# Patient Record
Sex: MALE | Race: BLACK OR AFRICAN AMERICAN | NOT HISPANIC OR LATINO | Employment: OTHER | URBAN - METROPOLITAN AREA
[De-identification: names, ages, dates, MRNs, and addresses within clinical notes are randomized per-mention and may not be internally consistent; named-entity substitution may affect disease eponyms.]

---

## 2020-04-26 ENCOUNTER — HOSPITAL ENCOUNTER (OUTPATIENT)
Dept: TELEMEDICINE | Facility: HOSPITAL | Age: 75
Discharge: HOME | End: 2020-04-26
Payer: MEDICARE

## 2020-04-26 DIAGNOSIS — G45.9 TRANSIENT ISCHEMIC ATTACK: ICD-10-CM

## 2020-04-26 PROCEDURE — G0426 PR INPT TELEHEALTH CONSULT 50M: ICD-10-PCS | Mod: GT,,, | Performed by: PSYCHIATRY & NEUROLOGY

## 2020-04-26 PROCEDURE — G0426 INPT/ED TELECONSULT50: HCPCS | Mod: GT,,, | Performed by: PSYCHIATRY & NEUROLOGY

## 2020-04-26 NOTE — ASSESSMENT & PLAN NOTE
Transient episode of speech dysfunction, now resolved. DDx includes TIA.  Antithrombotics for secondary stroke prevention:  Load aspirin 325 mg & clopidogrel 300 mg x 1 now, followed by daily aspirin 81 mg /  clopidogrel 75 mg x 30 days followed by monotherapy therafter      Statins for secondary stroke prevention and hyperlipidemia, if present:   Statins: Atorvastatin- 80 mg daily    Aggressive risk factor modification: HTN     Rehab efforts: The patient has been evaluated by a stroke team provider and the therapy needs have been fully considered based off the presenting complaints and exam findings. The following therapy evaluations are needed: PT evaluate and treat, OT evaluate and treat, SLP evaluate and treat    Diagnostics ordered/pending: CTA Head to assess vasculature , CTA Neck/Arch to assess vasculature, HgbA1C to assess blood glucose levels, Lipid Profile to assess cholesterol levels, MRI head without contrast to assess brain parenchyma, TTE to assess cardiac function/status     VTE prophylaxis: Enoxaparin 40 mg SQ every 24 hours    BP parameters: TIA: SBP <220 until imaging confirmation of no infarct

## 2020-04-26 NOTE — SUBJECTIVE & OBJECTIVE
Woke up with symptoms?: no    Recent bleeding noted: no  Does the patient take any Blood Thinners? no  Medications: No relevant medications      Past Medical History:   No past medical history on file.  Arthritis, HTN, DM, HLD, TIAs  Past Surgical History: no major surgeries within the last 2 weeks    Family History: no relevant history    Social History: no smoking, no drinking, no drugs    Allergies: Allergies have not been reviewed No relevant allergies    Review of Systems   Constitutional: Negative for appetite change, chills and fever.   HENT: Negative for congestion and sore throat.    Eyes: Negative for discharge and itching.   Respiratory: Negative for apnea and shortness of breath.    Cardiovascular: Negative for chest pain and palpitations.   Gastrointestinal: Negative for abdominal pain and anal bleeding.   Endocrine: Negative for cold intolerance and polydipsia.   Genitourinary: Negative for dysuria and hematuria.   Musculoskeletal: Negative for joint swelling and myalgias.   Skin: Negative for color change and rash.   Neurological: Negative for tremors.   Psychiatric/Behavioral: Negative for hallucinations and self-injury.     Objective:   Vitals:  /82, HR 66, o2 98%, RR 12    CT READ: No - not completed    Physical Exam   Constitutional: He appears well-nourished. No distress.   HENT:   Head: Atraumatic.   Right Ear: External ear normal.   Left Ear: External ear normal.   Eyes: Conjunctivae are normal. No scleral icterus.   Neck: Normal range of motion.   Pulmonary/Chest: Effort normal.   Abdominal: He exhibits no distension. There is no guarding.   Musculoskeletal: Normal range of motion. He exhibits no deformity.   Neurological: He is alert.   Skin: Skin is warm and dry.   Psychiatric: He has a normal mood and affect.

## 2020-04-26 NOTE — HPI
75M w/ 30 seconds of inability to speak, now resolved. The aforementioned symptoms have never happened before. There are no identified triggers or modifying factors. There have been no recurrent events. There are no other associated symptoms.

## 2022-02-09 ENCOUNTER — HISTORICAL (OUTPATIENT)
Dept: ADMINISTRATIVE | Facility: HOSPITAL | Age: 77
End: 2022-02-09

## 2022-04-11 ENCOUNTER — HISTORICAL (OUTPATIENT)
Dept: ADMINISTRATIVE | Facility: HOSPITAL | Age: 77
End: 2022-04-11
Payer: MEDICARE

## 2022-04-29 VITALS
DIASTOLIC BLOOD PRESSURE: 73 MMHG | WEIGHT: 154.31 LBS | BODY MASS INDEX: 24.8 KG/M2 | HEIGHT: 66 IN | SYSTOLIC BLOOD PRESSURE: 132 MMHG

## 2022-10-14 DIAGNOSIS — K76.9 LIVER LESION: Primary | ICD-10-CM

## 2022-10-31 DIAGNOSIS — K76.9 LIVER LESION: Primary | ICD-10-CM

## 2022-11-03 ENCOUNTER — OUTSIDE PLACE OF SERVICE (OUTPATIENT)
Dept: ADMINISTRATIVE | Facility: OTHER | Age: 77
End: 2022-11-03
Payer: MEDICARE

## 2022-11-09 ENCOUNTER — OFFICE VISIT (OUTPATIENT)
Dept: HEMATOLOGY/ONCOLOGY | Facility: CLINIC | Age: 77
End: 2022-11-09
Payer: MEDICARE

## 2022-11-09 VITALS
RESPIRATION RATE: 16 BRPM | DIASTOLIC BLOOD PRESSURE: 78 MMHG | BODY MASS INDEX: 19.99 KG/M2 | HEART RATE: 107 BPM | SYSTOLIC BLOOD PRESSURE: 134 MMHG | HEIGHT: 65 IN | TEMPERATURE: 99 F | WEIGHT: 120 LBS | OXYGEN SATURATION: 97 %

## 2022-11-09 DIAGNOSIS — G89.3 CANCER RELATED PAIN: ICD-10-CM

## 2022-11-09 DIAGNOSIS — C25.7 MALIGNANT NEOPLASM OF OTHER PARTS OF PANCREAS: ICD-10-CM

## 2022-11-09 PROCEDURE — 1101F PR PT FALLS ASSESS DOC 0-1 FALLS W/OUT INJ PAST YR: ICD-10-PCS | Mod: CPTII,,, | Performed by: STUDENT IN AN ORGANIZED HEALTH CARE EDUCATION/TRAINING PROGRAM

## 2022-11-09 PROCEDURE — 1101F PT FALLS ASSESS-DOCD LE1/YR: CPT | Mod: CPTII,,, | Performed by: STUDENT IN AN ORGANIZED HEALTH CARE EDUCATION/TRAINING PROGRAM

## 2022-11-09 PROCEDURE — 99205 OFFICE O/P NEW HI 60 MIN: CPT | Mod: ,,, | Performed by: STUDENT IN AN ORGANIZED HEALTH CARE EDUCATION/TRAINING PROGRAM

## 2022-11-09 PROCEDURE — 1125F AMNT PAIN NOTED PAIN PRSNT: CPT | Mod: CPTII,,, | Performed by: STUDENT IN AN ORGANIZED HEALTH CARE EDUCATION/TRAINING PROGRAM

## 2022-11-09 PROCEDURE — 3288F FALL RISK ASSESSMENT DOCD: CPT | Mod: CPTII,,, | Performed by: STUDENT IN AN ORGANIZED HEALTH CARE EDUCATION/TRAINING PROGRAM

## 2022-11-09 PROCEDURE — 1125F PR PAIN SEVERITY QUANTIFIED, PAIN PRESENT: ICD-10-PCS | Mod: CPTII,,, | Performed by: STUDENT IN AN ORGANIZED HEALTH CARE EDUCATION/TRAINING PROGRAM

## 2022-11-09 PROCEDURE — 3075F SYST BP GE 130 - 139MM HG: CPT | Mod: CPTII,,, | Performed by: STUDENT IN AN ORGANIZED HEALTH CARE EDUCATION/TRAINING PROGRAM

## 2022-11-09 PROCEDURE — 4010F ACE/ARB THERAPY RXD/TAKEN: CPT | Mod: CPTII,,, | Performed by: STUDENT IN AN ORGANIZED HEALTH CARE EDUCATION/TRAINING PROGRAM

## 2022-11-09 PROCEDURE — 99205 PR OFFICE/OUTPT VISIT, NEW, LEVL V, 60-74 MIN: ICD-10-PCS | Mod: ,,, | Performed by: STUDENT IN AN ORGANIZED HEALTH CARE EDUCATION/TRAINING PROGRAM

## 2022-11-09 PROCEDURE — 1159F PR MEDICATION LIST DOCUMENTED IN MEDICAL RECORD: ICD-10-PCS | Mod: CPTII,,, | Performed by: STUDENT IN AN ORGANIZED HEALTH CARE EDUCATION/TRAINING PROGRAM

## 2022-11-09 PROCEDURE — 3288F PR FALLS RISK ASSESSMENT DOCUMENTED: ICD-10-PCS | Mod: CPTII,,, | Performed by: STUDENT IN AN ORGANIZED HEALTH CARE EDUCATION/TRAINING PROGRAM

## 2022-11-09 PROCEDURE — 1159F MED LIST DOCD IN RCRD: CPT | Mod: CPTII,,, | Performed by: STUDENT IN AN ORGANIZED HEALTH CARE EDUCATION/TRAINING PROGRAM

## 2022-11-09 PROCEDURE — 3075F PR MOST RECENT SYSTOLIC BLOOD PRESS GE 130-139MM HG: ICD-10-PCS | Mod: CPTII,,, | Performed by: STUDENT IN AN ORGANIZED HEALTH CARE EDUCATION/TRAINING PROGRAM

## 2022-11-09 PROCEDURE — 3078F DIAST BP <80 MM HG: CPT | Mod: CPTII,,, | Performed by: STUDENT IN AN ORGANIZED HEALTH CARE EDUCATION/TRAINING PROGRAM

## 2022-11-09 PROCEDURE — 4010F PR ACE/ARB THEARPY RXD/TAKEN: ICD-10-PCS | Mod: CPTII,,, | Performed by: STUDENT IN AN ORGANIZED HEALTH CARE EDUCATION/TRAINING PROGRAM

## 2022-11-09 PROCEDURE — 3078F PR MOST RECENT DIASTOLIC BLOOD PRESSURE < 80 MM HG: ICD-10-PCS | Mod: CPTII,,, | Performed by: STUDENT IN AN ORGANIZED HEALTH CARE EDUCATION/TRAINING PROGRAM

## 2022-11-09 RX ORDER — AMLODIPINE BESYLATE 10 MG/1
10 TABLET ORAL DAILY
COMMUNITY
Start: 2022-08-03

## 2022-11-09 RX ORDER — NAPROXEN SODIUM 220 MG/1
81 TABLET, FILM COATED ORAL
COMMUNITY

## 2022-11-09 RX ORDER — METFORMIN HYDROCHLORIDE 500 MG/1
500 TABLET ORAL 2 TIMES DAILY
COMMUNITY
Start: 2022-08-03

## 2022-11-09 RX ORDER — GABAPENTIN 600 MG/1
TABLET ORAL
COMMUNITY
Start: 2022-10-03

## 2022-11-09 RX ORDER — DEXTROSE 4 G
TABLET,CHEWABLE ORAL
COMMUNITY
Start: 2022-07-19

## 2022-11-09 RX ORDER — SULFAMETHOXAZOLE AND TRIMETHOPRIM 800; 160 MG/1; MG/1
1 TABLET ORAL 2 TIMES DAILY
COMMUNITY
Start: 2022-07-19

## 2022-11-09 RX ORDER — LOSARTAN POTASSIUM 100 MG/1
100 TABLET ORAL DAILY
COMMUNITY
Start: 2022-08-03

## 2022-11-09 RX ORDER — FAMOTIDINE 20 MG/1
20 TABLET, FILM COATED ORAL DAILY
COMMUNITY
Start: 2022-10-20

## 2022-11-09 RX ORDER — ATORVASTATIN CALCIUM 20 MG/1
20 TABLET, FILM COATED ORAL DAILY
COMMUNITY
Start: 2022-08-20

## 2022-11-09 RX ORDER — HYDROCODONE BITARTRATE AND ACETAMINOPHEN 7.5; 325 MG/1; MG/1
1 TABLET ORAL EVERY 6 HOURS PRN
COMMUNITY
Start: 2022-10-12 | End: 2022-11-09 | Stop reason: ALTCHOICE

## 2022-11-09 RX ORDER — ONDANSETRON 4 MG/1
4 TABLET, ORALLY DISINTEGRATING ORAL EVERY 8 HOURS PRN
Qty: 30 TABLET | Refills: 0 | Status: SHIPPED | OUTPATIENT
Start: 2022-11-09 | End: 2022-12-23

## 2022-11-09 RX ORDER — GLIPIZIDE 5 MG/1
5 TABLET ORAL 2 TIMES DAILY
COMMUNITY
Start: 2022-08-20

## 2022-11-09 RX ORDER — INSULIN GLARGINE 100 [IU]/ML
INJECTION, SOLUTION SUBCUTANEOUS
COMMUNITY
Start: 2022-10-03

## 2022-11-09 RX ORDER — CITALOPRAM 10 MG/1
10 TABLET ORAL DAILY
COMMUNITY
Start: 2022-10-03

## 2022-11-09 RX ORDER — OXYCODONE HYDROCHLORIDE 5 MG/1
5 TABLET ORAL EVERY 6 HOURS PRN
Qty: 30 TABLET | Refills: 0 | Status: SHIPPED | OUTPATIENT
Start: 2022-11-09 | End: 2022-12-09

## 2022-11-09 RX ORDER — CLOPIDOGREL BISULFATE 75 MG/1
75 TABLET ORAL DAILY
COMMUNITY
Start: 2022-08-20

## 2022-11-09 NOTE — PROGRESS NOTES
Referring provider:  [facility] consult   Reason for consultation:  Pancreatic cancer     HPI    Patient is a 77-year-old with history of multiple CVAs, hypertension hyperlipidemia who was recently in the hospital with altered mentation when he was found to have a UTI.  During this workup he had a CT abdomen pelvis without contrast which revealed a pancreatic mass with liver metastatic disease concerning for malignancy.  Patient had a biopsy of this mass on 11/02/2022, pathology from which revealed moderately differentiated adenocarcinoma concerning for pancreatic biliary/upper GI primary.  He presented to clinic on 11/09/2022, to establish care to discuss further disease management.      Today, 11/09/2022, patient reports symptoms of fatigue, weakness, decreased appetite and declining ECOG.  He is requiring partial assistance with his ADLs.  He walks with a walker however denies any falls.  He reports nausea, vomiting causing decreased appetite and weight loss.  He reports pain around his right upper quadrant  Which is poorly controlled on his current pain regimen.      Patient has an ECOG of 2-3, lives with his 2 daughters, requiring partial assistance with his ADLs.  Current smoker, denies any recreational drug use, reports occasional alcohol use.  He is an extended family lives close by.  Denies any family history of malignancy.    Pathology  11/04/2022 liver biopsy:  Positive for malignancy consistent with moderately differentiated adenocarcinoma, CK7, CDX2 positive, negative for CK20 and TTF 1.      Imaging  CT abdomen pelvis without contrast:  Diffusely edematous appearing pancreas with peripancreatic inflammatory stranding and possible pancreatic ductal dilatation.  Findings can be seen in setting of acute pancreatitis.  Correlation with amylase and lipase is recommended.  Vague soft tissue density structure along the superior aspect of the pancreatic body suspicious for a 3.5 cm pancreatic mass.  Follow-up CT of  the abdomen with intravenous contrast is recommended for further eval when clinically feasible.  Development of multiple hypodense hepatic lesions considered highly suspicious for metastatic disease.  Status post cholecystectomy.  Ectasia of the abdominal aorta measuring 2.8 cm.  Stomach is poorly distended and this is felt to be account for apparent wall thickening.  Minimal pericardial effusion.    Past Medical History:   Diagnosis Date    Arthritis     Diabetes mellitus     Hypertension     Unspecified cholesteatoma, bilateral        Past Surgical History:   Procedure Laterality Date    CHOLECYSTECTOMY      stints Bilateral     LEGS AND NECK       Family History   Problem Relation Age of Onset    Emphysema Mother     Hypertension Father        Social History     Socioeconomic History    Marital status: Single   Tobacco Use    Smoking status: Former     Packs/day: 0.50     Years: 62.00     Pack years: 31.00     Types: Cigarettes    Smokeless tobacco: Never   Substance and Sexual Activity    Alcohol use: Yes     Comment: OCCAN    Drug use: Not Currently    Sexual activity: Not Currently       Current Outpatient Medications   Medication Sig Dispense Refill    amLODIPine (NORVASC) 10 MG tablet Take 10 mg by mouth once daily.      aspirin 81 MG Chew Take 81 mg by mouth.      atorvastatin (LIPITOR) 20 MG tablet Take 20 mg by mouth once daily.      blood sugar diagnostic Strp Check blood glucose 4 time(s) a day.      blood-glucose meter Misc Use as directed      citalopram (CELEXA) 10 MG tablet Take 10 mg by mouth once daily.      clopidogreL (PLAVIX) 75 mg tablet Take 75 mg by mouth once daily.      gabapentin (NEURONTIN) 600 MG tablet Take by mouth.      glipiZIDE (GLUCOTROL) 5 MG tablet Take 5 mg by mouth 2 (two) times daily.      losartan (COZAAR) 100 MG tablet Take 100 mg by mouth once daily.      metFORMIN (GLUCOPHAGE) 500 MG tablet Take 500 mg by mouth 2 (two) times daily.      famotidine (PEPCID) 20 MG tablet  Take 20 mg by mouth once daily.      LANTUS SOLOSTAR U-100 INSULIN glargine 100 units/mL SubQ pen Inject into the skin.      ondansetron (ZOFRAN-ODT) 4 MG TbDL Take 1 tablet (4 mg total) by mouth every 8 (eight) hours as needed. 30 tablet 0    oxyCODONE (ROXICODONE) 5 MG immediate release tablet Take 1 tablet (5 mg total) by mouth every 6 (six) hours as needed for Pain. 30 tablet 0    sulfamethoxazole-trimethoprim 800-160mg (BACTRIM DS) 800-160 mg Tab Take 1 tablet by mouth 2 (two) times daily.       No current facility-administered medications for this visit.       Review of patient's allergies indicates:  No Known Allergies      Review of systems   CONSTITUTIONAL: no fevers, no chills, + weight loss, + fatigue, + weakness  HEMATOLOGIC: no abnormal bleeding, no abnormal bruising, no drenching night sweats  ONCOLOGIC: no new masses or lumps  HEENT: no vision loss, no tinnitus or hearing loss, no nose bleeding, no dysphagia, no odynophagia  CVS: no chest pain, no palpitations, no dyspnea on exertion  RESP: no shortness of breath, no hemoptysis, no cough  GI: no nausea, no vomiting, no diarrhea, no constipation, no melena, no hematochezia, no hematemesis, + abdominal pain, no increase in abdominal girth  : no dysuria, no hematuria, no hesitancy, no scrotal swelling, no discharge  INTEGUMENT: no rashes, no abnormal bruising, no nail pitting, no hyperpigmentation  NEURO: no falls, no memory loss, no paresthesias or dysesthesias, no urofecal incontinence or retention, no loss of strength on any extremity  MSK: no back pain, no new joint pain, no joint swelling  PSYCH: no suicidal or homicidal ideation, no depression, no insomnia, no anhedonia  ENDOCRINE: no heat or cold intolerance, no polyuria, no polydipsia      Physical Exam:  Vitals:    11/09/22 1127   BP: 134/78   Pulse: 107   Resp: 16   Temp: 99.1 °F (37.3 °C)       ECOG PS 0  GA: AAOx3, NAD  HEENT: NCAT, PERRLA, EOMI, good dentition, moist oral mucous  membranes  LYMPH: no cervical, axillary or supraclavicular adenopathy  CVS: s1s2 RRR, no M/R/G  RESP: CTA b/l, no crackles, no wheezes or rhonchi  ABD: soft, NT, ND, BS+, no hepatosplenomegaly  EXT: no deformities, no pedal edema  SKIN: no rashes, warm and dry  NEURO: normal mentation, strength 5/5 on all 4 extremities, no sensory deficits      Assessment and plan     # Metastatic pancreatic adenocarcinoma, with liver involvement   Patient was excellent diagnosed with a pancreatic mass measuring 3.5 cm along with multiple hypodense hepatic lesions concerning for malignancy during his evaluation for altered mentation/UTI   Reviewed CT abdomen pelvis without contrast and pathology report  Discussed with patient and family in the hospital and today the diagnosis of stage IV pancreatic adenocarcinoma, palliative nature of treatment and incurable nature of the disease.    Patient has modest ECOG and appears frail.  He will not be a good candidate for intense chemotherapy  Discussed the option for single agent gemcitabine if NGS, MSI and HER2 is negative   Patient and his family would like to think and discuss the options of pursuing palliative systemic chemotherapy versus comfort care with hospice services, both of which are not unreasonable.    Patient's family will be calling us early next week to let us know about their decision   Discussed  narrow window of opportunity to initiate treatment given concerns for pending visceral crisis with liver involvement.      Plan   CBC, CMP, CEA today   Port placement with surgery in anticipation of patient's starting chemotherapy in the future   Orders for NGS, HER2 and MSI sent to pathology   Discussed options for targeted therapy/immunotherapy or HER2 directed therapy if above workup is positive.  Will hold off on CT chest to complete staging workup depending on patient's decision to pursue treatment.  Oxycodone 5 mg q.6 hours p.r.n. for cancer-related pain   Zofran 4 mg q.8  hours p.r.n. for nausea   Plan to follow-up in clinic in 10-14 days with MD to discuss above workup and further disease management      A total of  60 minutes were spent in review of records, interpretation of test, coordination of care, discussion and counseling with the patient.        Portions of the record may have been created with voice recognition software. Occasional wrong-word or sound-a-like substitutions may have occurred due to the inherent limitations of voice recognition software. Read the chart carefully and recognize, using context, where substitutions have occurred.

## 2022-11-19 NOTE — PROGRESS NOTES
DATE:  11/22/2022    PROBLEM:  STAGE IV Cancer Pancreas    HxPI:  77-year-old with history of multiple CVAs, hypertension hyperlipidemia who was recently in the hospital with altered mentation when he was found to have a UTI.  During this workup he had a CT abdomen pelvis without contrast which revealed a pancreatic mass with liver metastatic disease concerning for malignancy.  Patient had a biopsy of this mass on 11/02/2022, pathology from which revealed moderately differentiated adenocarcinoma concerning for pancreatic biliary/upper GI primary.  He presented to clinic on 11/09/2022, to establish care to discuss further disease management.      INTERVAL Hx:  11/22/2022  Unfortunately, patient has deteriorated further since last seen in this clinic.  He is resting more.  His ECOG performance status is now down to 3.  He may be up and about 30-40% of the day.  Tumor marker study revealed patient is equivocal on HCT are at 2+.  This might present a targeted approach if the patient and family are interested.    PATH:  11/04/2022 liver biopsy:  Positive for malignancy consistent with moderately differentiated adenocarcinoma, CK7, CDX2 positive, negative for CK20 and TTF 1.    IMAGING:  CT abdomen pelvis without contrast:  Diffusely edematous appearing pancreas with peripancreatic inflammatory stranding and possible pancreatic ductal dilatation.  Findings can be seen in setting of acute pancreatitis.  Correlation with amylase and lipase is recommended.  Vague soft tissue density structure along the superior aspect of the pancreatic body suspicious for a 3.5 cm pancreatic mass.  Follow-up CT of the abdomen with intravenous contrast is recommended for further eval when clinically feasible.  Development of multiple hypodense hepatic lesions considered highly suspicious for metastatic disease.  Status post cholecystectomy.  Ectasia of the abdominal aorta measuring 2.8 cm.  Stomach is poorly distended and this is felt to be  account for apparent wall thickening.  Minimal pericardial effusion.    Past Medical History:   Diagnosis Date    Arthritis     Diabetes mellitus     Hypertension     Unspecified cholesteatoma, bilateral        Past Surgical History:   Procedure Laterality Date    CHOLECYSTECTOMY      stints Bilateral     LEGS AND NECK       Family History   Problem Relation Age of Onset    Emphysema Mother     Hypertension Father        Social History     Socioeconomic History    Marital status: Single   Tobacco Use    Smoking status: Former     Packs/day: 0.50     Years: 62.00     Pack years: 31.00     Types: Cigarettes    Smokeless tobacco: Never   Substance and Sexual Activity    Alcohol use: Yes     Comment: OCCAN    Drug use: Not Currently    Sexual activity: Not Currently       Current Outpatient Medications   Medication Sig Dispense Refill    amLODIPine (NORVASC) 10 MG tablet Take 10 mg by mouth once daily.      aspirin 81 MG Chew Take 81 mg by mouth.      atorvastatin (LIPITOR) 20 MG tablet Take 20 mg by mouth once daily.      blood sugar diagnostic Strp Check blood glucose 4 time(s) a day.      blood-glucose meter Misc Use as directed      citalopram (CELEXA) 10 MG tablet Take 10 mg by mouth once daily.      clopidogreL (PLAVIX) 75 mg tablet Take 75 mg by mouth once daily.      famotidine (PEPCID) 20 MG tablet Take 20 mg by mouth once daily.      gabapentin (NEURONTIN) 600 MG tablet Take by mouth.      glipiZIDE (GLUCOTROL) 5 MG tablet Take 5 mg by mouth 2 (two) times daily.      LANTUS SOLOSTAR U-100 INSULIN glargine 100 units/mL SubQ pen Inject into the skin.      losartan (COZAAR) 100 MG tablet Take 100 mg by mouth once daily.      metFORMIN (GLUCOPHAGE) 500 MG tablet Take 500 mg by mouth 2 (two) times daily.      ondansetron (ZOFRAN-ODT) 4 MG TbDL Take 1 tablet (4 mg total) by mouth every 8 (eight) hours as needed. 30 tablet 0    oxyCODONE (ROXICODONE) 5 MG immediate release tablet Take 1 tablet (5 mg total) by mouth  every 6 (six) hours as needed for Pain. 30 tablet 0    sulfamethoxazole-trimethoprim 800-160mg (BACTRIM DS) 800-160 mg Tab Take 1 tablet by mouth 2 (two) times daily.       No current facility-administered medications for this visit.       Review of patient's allergies indicates:  No Known Allergies    ROS:  -GENERAL:  Patient denies fevers, chills, weight loss.  -HEENT:  No recent change in vision, hearing, taste or smell.  -LUNGS:  Patient denies cough, sputum production, hemoptysis, or shortness of breath.  -COR:  Patient denies chest pain or palpitations.  -BREAST/CHEST WAll:  Patient denies breast masses or chest wall abnormalities.  -GI:.  RUQ pain radiating to back  -:  Patient denies dysuria, hematuria, or lower tract obstructive symptomatology  -DERM:  Patient denies skin lesions.  -EXT:  Without clubbing, cyanosis, or edema.  OBSERVATIONS:  -GENERAL:  Alert, oriented, appears comfortable at rest.  -VS:  Febrile.  106/69  -HEENT:  PERRLA.  EOMI.  Nasal/oropharynx benign.  -LUNGS:  Clear to auscultation and percussion  -COR:  Regular rate rhythm without murmurs, rubs, heaves.  -BREAST/CHEST WAll:  Normal breasts/chest wall region without palpable masses  -ABD:  Pain on palpation of RUQ  -LYMPH:  No palpable peripheral lymphadenopathy.  -EXT:  Without clubbing, cyanosis, or edema.    ASSESSMENT:   # Metastatic pancreatic adenocarcinoma, with liver involvement   Patient was excellent diagnosed with a pancreatic mass measuring 3.5 cm along with multiple hypodense hepatic lesions concerning for malignancy during his evaluation for altered mentation/UTI   Reviewed CT abdomen pelvis without contrast and pathology report  Discussed with patient and family in the hospital and today the diagnosis of stage IV pancreatic adenocarcinoma, palliative nature of treatment and incurable nature of the disease.    Patient has modest ECOG and appears frail.  He will not be a good candidate for intense chemotherapy  Discussed  the option for single agent gemcitabine if NGS, MSI and HER2 is negative   Patient and his family would like to think and discuss the options of pursuing palliative systemic chemotherapy versus comfort care with hospice services, both of which are not unreasonable.    Patient's family will be calling us early next week to let us know about their decision   Discussed  narrow window of opportunity to initiate treatment given concerns for pending visceral crisis with liver involvement.  Tumor is HCT are 2+ positive.     PLAN:  Patient clearly not a candidate for aggressive systemic chemotherapy.  He might not even be a candidate for targeted treatment.  Nonetheless, I gave him literature to read concerning Erbitux and trastuzumab.  We will increase patient's pain regimen to add long-acting pain medication form of MS Contin 30 mg p.o. q.12 hours.  Patient and family members understand that we are looking at a terminal disease here.  We will hold TeleMed visit next week.  Patient may very well decide to enter hospice for terminal care and we will act accordingly.  In the meantime we will also get patient set up for a wheelchair as he is no longer ambulatory.    NILE GRAY M.D., FACP

## 2022-11-23 ENCOUNTER — OFFICE VISIT (OUTPATIENT)
Dept: HEMATOLOGY/ONCOLOGY | Facility: CLINIC | Age: 77
End: 2022-11-23
Payer: MEDICARE

## 2022-11-23 VITALS
OXYGEN SATURATION: 100 % | HEART RATE: 107 BPM | DIASTOLIC BLOOD PRESSURE: 69 MMHG | RESPIRATION RATE: 18 BRPM | SYSTOLIC BLOOD PRESSURE: 106 MMHG | WEIGHT: 116 LBS | BODY MASS INDEX: 19.3 KG/M2 | TEMPERATURE: 98 F

## 2022-11-23 DIAGNOSIS — C25.1 CANCER, PANCREAS, BODY: Primary | ICD-10-CM

## 2022-11-23 PROCEDURE — 3078F PR MOST RECENT DIASTOLIC BLOOD PRESSURE < 80 MM HG: ICD-10-PCS | Mod: CPTII,,, | Performed by: INTERNAL MEDICINE

## 2022-11-23 PROCEDURE — 4010F ACE/ARB THERAPY RXD/TAKEN: CPT | Mod: CPTII,,, | Performed by: INTERNAL MEDICINE

## 2022-11-23 PROCEDURE — 1159F PR MEDICATION LIST DOCUMENTED IN MEDICAL RECORD: ICD-10-PCS | Mod: CPTII,,, | Performed by: INTERNAL MEDICINE

## 2022-11-23 PROCEDURE — 1159F MED LIST DOCD IN RCRD: CPT | Mod: CPTII,,, | Performed by: INTERNAL MEDICINE

## 2022-11-23 PROCEDURE — 3074F PR MOST RECENT SYSTOLIC BLOOD PRESSURE < 130 MM HG: ICD-10-PCS | Mod: CPTII,,, | Performed by: INTERNAL MEDICINE

## 2022-11-23 PROCEDURE — 99214 PR OFFICE/OUTPT VISIT, EST, LEVL IV, 30-39 MIN: ICD-10-PCS | Mod: ,,, | Performed by: INTERNAL MEDICINE

## 2022-11-23 PROCEDURE — 99214 OFFICE O/P EST MOD 30 MIN: CPT | Mod: ,,, | Performed by: INTERNAL MEDICINE

## 2022-11-23 PROCEDURE — 3074F SYST BP LT 130 MM HG: CPT | Mod: CPTII,,, | Performed by: INTERNAL MEDICINE

## 2022-11-23 PROCEDURE — 4010F PR ACE/ARB THEARPY RXD/TAKEN: ICD-10-PCS | Mod: CPTII,,, | Performed by: INTERNAL MEDICINE

## 2022-11-23 PROCEDURE — 3078F DIAST BP <80 MM HG: CPT | Mod: CPTII,,, | Performed by: INTERNAL MEDICINE

## 2022-11-23 RX ORDER — CALCIUM CARB/VITAMIN D3/VIT K1 500-500-40
TABLET,CHEWABLE ORAL
COMMUNITY
Start: 2022-07-19

## 2022-11-23 RX ORDER — MORPHINE SULFATE 30 MG/1
30 TABLET, FILM COATED, EXTENDED RELEASE ORAL 2 TIMES DAILY
Qty: 60 TABLET | Refills: 0 | Status: SHIPPED | OUTPATIENT
Start: 2022-11-23

## 2022-11-29 ENCOUNTER — OFFICE VISIT (OUTPATIENT)
Dept: HEMATOLOGY/ONCOLOGY | Facility: CLINIC | Age: 77
End: 2022-11-29
Payer: MEDICARE

## 2022-11-29 DIAGNOSIS — C25.7 MALIGNANT NEOPLASM OF OTHER PARTS OF PANCREAS: ICD-10-CM

## 2022-11-29 DIAGNOSIS — C25.1 CANCER, PANCREAS, BODY: ICD-10-CM

## 2022-11-29 PROCEDURE — 1159F PR MEDICATION LIST DOCUMENTED IN MEDICAL RECORD: ICD-10-PCS | Mod: 95,CPTII,, | Performed by: INTERNAL MEDICINE

## 2022-11-29 PROCEDURE — 1159F MED LIST DOCD IN RCRD: CPT | Mod: 95,CPTII,, | Performed by: INTERNAL MEDICINE

## 2022-11-29 PROCEDURE — 1160F RVW MEDS BY RX/DR IN RCRD: CPT | Mod: 95,CPTII,, | Performed by: INTERNAL MEDICINE

## 2022-11-29 PROCEDURE — 4010F ACE/ARB THERAPY RXD/TAKEN: CPT | Mod: 95,CPTII,, | Performed by: INTERNAL MEDICINE

## 2022-11-29 PROCEDURE — 4010F PR ACE/ARB THEARPY RXD/TAKEN: ICD-10-PCS | Mod: 95,CPTII,, | Performed by: INTERNAL MEDICINE

## 2022-11-29 PROCEDURE — 1160F PR REVIEW ALL MEDS BY PRESCRIBER/CLIN PHARMACIST DOCUMENTED: ICD-10-PCS | Mod: 95,CPTII,, | Performed by: INTERNAL MEDICINE

## 2022-11-29 PROCEDURE — 99442 PR PHYSICIAN TELEPHONE EVALUATION 11-20 MIN: CPT | Mod: 95,,, | Performed by: INTERNAL MEDICINE

## 2022-11-29 PROCEDURE — 99442 PR PHYSICIAN TELEPHONE EVALUATION 11-20 MIN: ICD-10-PCS | Mod: 95,,, | Performed by: INTERNAL MEDICINE

## 2022-11-29 RX ORDER — OXYCODONE AND ACETAMINOPHEN 10; 325 MG/1; MG/1
1 TABLET ORAL EVERY 4 HOURS PRN
Qty: 120 TABLET | Refills: 0 | Status: SHIPPED | OUTPATIENT
Start: 2022-11-29

## 2022-11-29 RX ORDER — MORPHINE SULFATE 30 MG/1
30 TABLET, FILM COATED, EXTENDED RELEASE ORAL EVERY 8 HOURS PRN
Qty: 20 TABLET | Refills: 0 | Status: SHIPPED | OUTPATIENT
Start: 2022-11-29

## 2022-11-29 NOTE — PROGRESS NOTES
TELEMED VISIT  PATIENT LOCATION: HOME  VISIT TYPE: VIRTUAL AUDIO  VISIT TIME: 13 MIN.    DATE:  11/29/2022    PROBLEM:  STAGE IV Cancer Pancreas    HxPI:  77-year-old with history of multiple CVAs, hypertension hyperlipidemia who was recently in the hospital with altered mentation when he was found to have a UTI.  During this workup he had a CT abdomen pelvis without contrast which revealed a pancreatic mass with liver metastatic disease concerning for malignancy.  Patient had a biopsy of this mass on 11/02/2022, pathology from which revealed moderately differentiated adenocarcinoma concerning for pancreatic biliary/upper GI primary.  He presented to clinic on 11/09/2022, to establish care to discuss further disease management.      INTERVAL Hx:  11/29/2022  Unfortunately, patient has not done well since last seen.  The Percocet 5/325 is not controlling his pain.  And he is going through it at 4 hour intervals.  Our attempts at getting him started on MS Contin 30 mg p.o. q.12 hours was unsuccessful as his pharmacy did not have any in stock and we just learned about that matter today.  Patient has not come to any conclusion as to whether or not he wants cancer treatment for palliative intent heavy well make that decision until we get his pain under control.    PATH:  11/04/2022 liver biopsy:  Positive for malignancy consistent with moderately differentiated adenocarcinoma, CK7, CDX2 positive, negative for CK20 and TTF 1.    IMAGING:  CT abdomen pelvis without contrast:  Diffusely edematous appearing pancreas with peripancreatic inflammatory stranding and possible pancreatic ductal dilatation.  Findings can be seen in setting of acute pancreatitis.  Correlation with amylase and lipase is recommended.  Vague soft tissue density structure along the superior aspect of the pancreatic body suspicious for a 3.5 cm pancreatic mass.  Follow-up CT of the abdomen with intravenous contrast is recommended for further eval when  clinically feasible.  Development of multiple hypodense hepatic lesions considered highly suspicious for metastatic disease.  Status post cholecystectomy.  Ectasia of the abdominal aorta measuring 2.8 cm.  Stomach is poorly distended and this is felt to be account for apparent wall thickening.  Minimal pericardial effusion.    Past Medical History:   Diagnosis Date    Arthritis     Diabetes mellitus     Hypertension     Unspecified cholesteatoma, bilateral        Past Surgical History:   Procedure Laterality Date    CHOLECYSTECTOMY      stints Bilateral     LEGS AND NECK       Family History   Problem Relation Age of Onset    Emphysema Mother     Hypertension Father        Social History     Socioeconomic History    Marital status: Single   Tobacco Use    Smoking status: Some Days     Packs/day: 0.25     Years: 62.00     Pack years: 15.50     Types: Cigarettes    Smokeless tobacco: Never   Substance and Sexual Activity    Alcohol use: Not Currently     Comment: OCCAN    Drug use: Not Currently    Sexual activity: Not Currently       Current Outpatient Medications   Medication Sig Dispense Refill    amLODIPine (NORVASC) 10 MG tablet Take 10 mg by mouth once daily.      aspirin 81 MG Chew Take 81 mg by mouth.      atorvastatin (LIPITOR) 20 MG tablet Take 20 mg by mouth once daily.      blood sugar diagnostic Strp Check blood glucose 4 time(s) a day.      blood-glucose meter Misc Use as directed      citalopram (CELEXA) 10 MG tablet Take 10 mg by mouth once daily.      clopidogreL (PLAVIX) 75 mg tablet Take 75 mg by mouth once daily.      famotidine (PEPCID) 20 MG tablet Take 20 mg by mouth once daily.      gabapentin (NEURONTIN) 600 MG tablet Take by mouth.      glipiZIDE (GLUCOTROL) 5 MG tablet Take 5 mg by mouth 2 (two) times daily.      LANTUS SOLOSTAR U-100 INSULIN glargine 100 units/mL SubQ pen Inject into the skin.      losartan (COZAAR) 100 MG tablet Take 100 mg by mouth once daily.      metFORMIN (GLUCOPHAGE)  500 MG tablet Take 500 mg by mouth 2 (two) times daily.      morphine (MS CONTIN) 30 MG 12 hr tablet Take 1 tablet (30 mg total) by mouth 2 (two) times daily. 60 tablet 0    morphine (MS CONTIN) 30 MG 12 hr tablet Take 1 tablet (30 mg total) by mouth every 8 (eight) hours as needed for Pain. 20 tablet 0    ondansetron (ZOFRAN-ODT) 4 MG TbDL Take 1 tablet (4 mg total) by mouth every 8 (eight) hours as needed. 30 tablet 0    oxyCODONE (ROXICODONE) 5 MG immediate release tablet Take 1 tablet (5 mg total) by mouth every 6 (six) hours as needed for Pain. 30 tablet 0    oxyCODONE-acetaminophen (PERCOCET)  mg per tablet Take 1 tablet by mouth every 4 (four) hours as needed for Pain. 120 tablet 0    sulfamethoxazole-trimethoprim 800-160mg (BACTRIM DS) 800-160 mg Tab Take 1 tablet by mouth 2 (two) times daily.      SUPER THIN LANCETS 28 gauge Misc CHECK BLOOD GLUCOSE FOUR TIMES DAILY       No current facility-administered medications for this visit.       Review of patient's allergies indicates:  No Known Allergies    ROS:  -GENERAL:  Patient denies fevers, chills, weight loss.  -HEENT:  No recent change in vision, hearing, taste or smell.  -LUNGS:  Patient denies cough, sputum production, hemoptysis, or shortness of breath.  -COR:  Patient denies chest pain or palpitations.  -BREAST/CHEST WAll:  Patient denies breast masses or chest wall abnormalities.  -GI:.  Worsening abdominal pain since last seen  -:  Patient denies dysuria, hematuria, or lower tract obstructive symptomatology  -DERM:  Patient denies skin lesions.  -EXT:  Without clubbing, cyanosis, or edema.  OBSERVATIONS:  -GENERAL:  Alert, oriented, appears comfortable at rest.  -HEENT:  PERRLA.  EOMI.  Nasal/oropharynx benign.  -LUNGS:  Clear to auscultation and percussion  -COR:  Regular rate rhythm without murmurs, rubs, heaves.  -BREAST/CHEST WAll:  Normal breasts/chest wall region without palpable masses  -ABD:  Soft, nontender, positive bowel sounds  without masses or visceromegaly.  -LYMPH:  No palpable peripheral lymphadenopathy.  -EXT:  Without clubbing, cyanosis, or edema.    ASSESSMENT:   # Metastatic pancreatic adenocarcinoma, with liver involvement   Patient was excellent diagnosed with a pancreatic mass measuring 3.5 cm along with multiple hypodense hepatic lesions concerning for malignancy during his evaluation for altered mentation/UTI   Reviewed CT abdomen pelvis without contrast and pathology report  Discussed with patient and family in the hospital and today the diagnosis of stage IV pancreatic adenocarcinoma, palliative nature of treatment and incurable nature of the disease.    Patient has modest ECOG and appears frail.  He will not be a good candidate for intense chemotherapy  Discussed the option for single agent gemcitabine if NGS, MSI and HER2 is negative   Patient and his family would like to think and discuss the options of pursuing palliative systemic chemotherapy versus comfort care with hospice services, both of which are not unreasonable.    Patient's family will be calling us early next week to let us know about their decision   Discussed  narrow window of opportunity to initiate treatment given concerns for pending visceral crisis with liver involvement.  Tumor is HCT are 2+ positive.     PLAN:  Attempt script for MS Contin yet again.  Increase Percocet to 10/325.  We will have another TeleMed visit next week to see if were getting his pain under control.  If patient is still ambivalent about cancer treatment on next TeleMed visit, it will likely be time for hospice.    NILE GRAY M.D., FACP

## 2022-11-30 RX ORDER — MORPHINE SULFATE 30 MG/1
30 TABLET, FILM COATED, EXTENDED RELEASE ORAL 2 TIMES DAILY
Qty: 60 TABLET | Refills: 0 | OUTPATIENT
Start: 2022-11-30

## 2022-12-02 ENCOUNTER — OFFICE VISIT (OUTPATIENT)
Dept: HEMATOLOGY/ONCOLOGY | Facility: CLINIC | Age: 77
End: 2022-12-02
Payer: MEDICARE

## 2022-12-02 DIAGNOSIS — C25.7 MALIGNANT NEOPLASM OF OTHER PARTS OF PANCREAS: Primary | ICD-10-CM

## 2022-12-02 PROCEDURE — 4010F ACE/ARB THERAPY RXD/TAKEN: CPT | Mod: CPTII,95,, | Performed by: INTERNAL MEDICINE

## 2022-12-02 PROCEDURE — 99499 NO LOS: ICD-10-PCS | Mod: 95,,, | Performed by: INTERNAL MEDICINE

## 2022-12-02 PROCEDURE — 4010F PR ACE/ARB THEARPY RXD/TAKEN: ICD-10-PCS | Mod: CPTII,95,, | Performed by: INTERNAL MEDICINE

## 2022-12-02 PROCEDURE — 99499 UNLISTED E&M SERVICE: CPT | Mod: 95,,, | Performed by: INTERNAL MEDICINE

## 2022-12-09 ENCOUNTER — TELEPHONE (OUTPATIENT)
Dept: HEMATOLOGY/ONCOLOGY | Facility: CLINIC | Age: 77
End: 2022-12-09
Payer: MEDICARE

## 2022-12-09 NOTE — TELEPHONE ENCOUNTER
Pt's daughter reports Pt was admitted to hospice yesterday by [facility] without her consent and she has power of  over Pt. Spoke with Bal with [facility] sent over request for Pt to be transferred to hospice. Spoke with Genesis with [facility] hospitals request was sent to hospice d/t family request in home with occupational therapist and also does not have a POA on file for this Pt. Pt's daughter notified states would like hospice cancelled. Instructed daughter to bring in copy of power of  paperwork to appt on 12/14 at 2:40pm. Spoke with Bal with [facility] instructed to discontinue hospice services.--SC, LPN

## 2022-12-16 PROCEDURE — 99223 1ST HOSP IP/OBS HIGH 75: CPT | Mod: ,,, | Performed by: STUDENT IN AN ORGANIZED HEALTH CARE EDUCATION/TRAINING PROGRAM

## 2022-12-16 PROCEDURE — 99223 PR INITIAL HOSPITAL CARE,LEVL III: ICD-10-PCS | Mod: ,,, | Performed by: STUDENT IN AN ORGANIZED HEALTH CARE EDUCATION/TRAINING PROGRAM

## 2022-12-23 ENCOUNTER — OUTSIDE PLACE OF SERVICE (OUTPATIENT)
Dept: ADMINISTRATIVE | Facility: OTHER | Age: 77
End: 2022-12-23
Payer: MEDICARE